# Patient Record
(demographics unavailable — no encounter records)

---

## 2024-11-07 NOTE — HEALTH RISK ASSESSMENT
[Good] : ~his/her~  mood as  good [No] : In the past 12 months have you used drugs other than those required for medical reasons? No [No falls in past year] : Patient reported no falls in the past year [0] : 2) Feeling down, depressed, or hopeless: Not at all (0) [PHQ-2 Negative - No further assessment needed] : PHQ-2 Negative - No further assessment needed [Fully functional (bathing, dressing, toileting, transferring, walking, feeding)] : Fully functional (bathing, dressing, toileting, transferring, walking, feeding) [Fully functional (using the telephone, shopping, preparing meals, housekeeping, doing laundry, using] : Fully functional and needs no help or supervision to perform IADLs (using the telephone, shopping, preparing meals, housekeeping, doing laundry, using transportation, managing medications and managing finances) [Never] : Never [[id number]] : 0 [Change in mental status noted] : No change in mental status noted [Reports changes in hearing] : Reports no changes in hearing [Reports changes in vision] : Reports no changes in vision [ColonoscopyDate] : 2023

## 2024-11-07 NOTE — HISTORY OF PRESENT ILLNESS
[de-identified] : 60 year old male presents for initial annual exam. has seen PCP in several years, but did see cardio last year, echo-normal as per patient   h/o ED on sildenifil 25mg from [facility] subscription.   difficulty sleeping, sweating at times for many years  diet- mostly home cooking exercise- running intermittently but limited to joint pains  +nocturia, no hematuria, feels incomplete emptying at times.  never seen urologist in the past. no fmhx of prostate CA  Sleep Apnea Screening (STOP) 1-Do you snore? y 2-Do you feel fatigued during the day? y  3-Do you wake up feeling like you haven't slept? y  4-Have you been told you stop breathing at night? n  5-Do you gasp for air or choke while sleeping? n  6-Do you have high blood pressure or are on medication to control high blood pressure? n  SCORE: If you checked YES to two or more questions on the STOP portion you are at risk for CARLI.      employed-dentist  non-smoker

## 2024-11-07 NOTE — HEALTH RISK ASSESSMENT
[Good] : ~his/her~  mood as  good [No] : In the past 12 months have you used drugs other than those required for medical reasons? No [No falls in past year] : Patient reported no falls in the past year [0] : 2) Feeling down, depressed, or hopeless: Not at all (0) [PHQ-2 Negative - No further assessment needed] : PHQ-2 Negative - No further assessment needed [Fully functional (bathing, dressing, toileting, transferring, walking, feeding)] : Fully functional (bathing, dressing, toileting, transferring, walking, feeding) [Fully functional (using the telephone, shopping, preparing meals, housekeeping, doing laundry, using] : Fully functional and needs no help or supervision to perform IADLs (using the telephone, shopping, preparing meals, housekeeping, doing laundry, using transportation, managing medications and managing finances) [Never] : Never [[id number]] : 0 [Change in mental status noted] : No change in mental status noted [Reports changes in hearing] : Reports no changes in hearing [Reports changes in vision] : Reports no changes in vision [ColonoscopyDate] : 2023

## 2024-11-07 NOTE — ASSESSMENT
[FreeTextEntry1] : //  ED, using sidenifil from [facility] -advised to sign up for [facility] and may send meds there for reduced price   Nocturia, incomplete emptying -check PSA  -urology referral   Difficulty sleeping, night sweats, moderate risk for CARLI  -recommend home sleep study, would like to hold off for now but will try mouth guard from his office to see if any improvement for now   Occasional ectopic beats.  no cp or soboe  -cardio reevaluation   Healthcare Maintenance -Advise Yearly Skin cancer screening with Dermatologist  -Advise Yearly Eye exam with Ophthalmologist -Advise Yearly Dental exam -Educated of the importance of Healthy diet, such as Mediterranean Diet and Exercise, such as walking >20 minutes a day and increasing gradually as tolerated  Immunizations -Flu vaccine  -Covid vaccine  -Discussed shingles vaccine (shingrix), advised to verify with insurance if its covered through medical or prescription plan  Preventative screening  -advised to get colonoscopy for colon cancer screening -up to date  -will check PSA for prostate cancer screening -labs drawn

## 2024-11-07 NOTE — HISTORY OF PRESENT ILLNESS
[de-identified] : 60 year old male presents for initial annual exam. has seen PCP in several years, but did see cardio last year, echo-normal as per patient   h/o ED on sildenifil 25mg from [facility] subscription.   difficulty sleeping, sweating at times for many years  diet- mostly home cooking exercise- running intermittently but limited to joint pains  +nocturia, no hematuria, feels incomplete emptying at times.  never seen urologist in the past. no fmhx of prostate CA  Sleep Apnea Screening (STOP) 1-Do you snore? y 2-Do you feel fatigued during the day? y  3-Do you wake up feeling like you haven't slept? y  4-Have you been told you stop breathing at night? n  5-Do you gasp for air or choke while sleeping? n  6-Do you have high blood pressure or are on medication to control high blood pressure? n  SCORE: If you checked YES to two or more questions on the STOP portion you are at risk for CARLI.      employed-dentist  non-smoker

## 2024-11-07 NOTE — PHYSICAL EXAM
[Normal] : normal rate, regular rhythm, normal S1 and S2 and no murmur heard [Pedal Pulses Present] : the pedal pulses are present [No Edema] : there was no peripheral edema [Soft] : abdomen soft [Non Tender] : non-tender [Non-distended] : non-distended [No HSM] : no HSM [Normal Posterior Cervical Nodes] : no posterior cervical lymphadenopathy [Normal Anterior Cervical Nodes] : no anterior cervical lymphadenopathy [No CVA Tenderness] : no CVA  tenderness [No Joint Swelling] : no joint swelling [No Rash] : no rash [No Focal Deficits] : no focal deficits [Normal Affect] : the affect was normal [Normal Mood] : the mood was normal

## 2025-01-22 NOTE — HISTORY OF PRESENT ILLNESS
[8] : 8 [5] : 5 [Dull/Aching] : dull/aching [Localized] : localized [Stabbing] : stabbing [Coughing] : coughing [Full time] : Work status: full time [de-identified] : 59 y/o M s/p fall last week with R sided rib pain. denies chest pain or shortness of breath. pain with deep inhalation or cough. He has not tried any interventions. denies head strike or LOC. He shoveled snow this past weekend and pain got worse. [] : Post Surgical Visit: no [FreeTextEntry1] : Right ribs [FreeTextEntry3] : 1/11/25 [FreeTextEntry5] : Patient slipped and fell down on his ribs on 1/11/25. pain got worse in the past few days. pain with coughing.

## 2025-01-22 NOTE — ASSESSMENT
[FreeTextEntry1] :  A/P R rib fracture - sling offered, pt deferred - ice - NSAIDs prn - strict return to ED precautions with difficulty breathing or chest pain - f/u orthopedics 1 week

## 2025-01-22 NOTE — IMAGING
[de-identified] :  PE R side chest: + ecchymosis, skin intact, no crepitus, +tenderness to anterior/lateral side of ribs. Equal chest rise. [Right] : right rib [No bony abnormalities] : No bony abnormalities [Fracture] : Fracture [FreeTextEntry5] : R rib fracture